# Patient Record
Sex: FEMALE | Race: WHITE | NOT HISPANIC OR LATINO | Employment: UNEMPLOYED | RURAL
[De-identification: names, ages, dates, MRNs, and addresses within clinical notes are randomized per-mention and may not be internally consistent; named-entity substitution may affect disease eponyms.]

---

## 2020-11-02 ENCOUNTER — HISTORICAL (OUTPATIENT)
Dept: ADMINISTRATIVE | Facility: HOSPITAL | Age: 50
End: 2020-11-02

## 2020-11-02 LAB
BACTERIA #/AREA URNS HPF: ABNORMAL /HPF
CLARITY UR: CLEAR
COLOR UR: YELLOW
RBC #/AREA URNS HPF: ABNORMAL /HPF (ref 0–3)
SQUAMOUS #/AREA URNS LPF: ABNORMAL /LPF
WBC #/AREA URNS HPF: ABNORMAL /HPF (ref 0–5)

## 2020-11-04 LAB
REPORT: NORMAL

## 2020-12-27 ENCOUNTER — HISTORICAL (OUTPATIENT)
Dept: ADMINISTRATIVE | Facility: HOSPITAL | Age: 50
End: 2020-12-27

## 2022-01-18 ENCOUNTER — OFFICE VISIT (OUTPATIENT)
Dept: FAMILY MEDICINE | Facility: CLINIC | Age: 52
End: 2022-01-18
Payer: COMMERCIAL

## 2022-01-18 VITALS
WEIGHT: 180 LBS | OXYGEN SATURATION: 99 % | HEART RATE: 98 BPM | RESPIRATION RATE: 18 BRPM | DIASTOLIC BLOOD PRESSURE: 98 MMHG | HEIGHT: 60 IN | SYSTOLIC BLOOD PRESSURE: 140 MMHG | BODY MASS INDEX: 35.34 KG/M2 | TEMPERATURE: 99 F

## 2022-01-18 DIAGNOSIS — R09.81 SINUS CONGESTION: ICD-10-CM

## 2022-01-18 DIAGNOSIS — U07.1 COVID-19: Primary | ICD-10-CM

## 2022-01-18 LAB
CTP QC/QA: YES
CTP QC/QA: YES
FLUAV AG NPH QL: NEGATIVE
FLUBV AG NPH QL: NEGATIVE
SARS-COV-2 AG RESP QL IA.RAPID: POSITIVE

## 2022-01-18 PROCEDURE — 3008F BODY MASS INDEX DOCD: CPT | Mod: CPTII,,, | Performed by: NURSE PRACTITIONER

## 2022-01-18 PROCEDURE — 87804 POCT INFLUENZA A/B: ICD-10-PCS | Mod: QW,,, | Performed by: NURSE PRACTITIONER

## 2022-01-18 PROCEDURE — 87426 SARSCOV CORONAVIRUS AG IA: CPT | Mod: QW,,, | Performed by: NURSE PRACTITIONER

## 2022-01-18 PROCEDURE — 3077F PR MOST RECENT SYSTOLIC BLOOD PRESSURE >= 140 MM HG: ICD-10-PCS | Mod: CPTII,,, | Performed by: NURSE PRACTITIONER

## 2022-01-18 PROCEDURE — 3080F DIAST BP >= 90 MM HG: CPT | Mod: CPTII,,, | Performed by: NURSE PRACTITIONER

## 2022-01-18 PROCEDURE — 3077F SYST BP >= 140 MM HG: CPT | Mod: CPTII,,, | Performed by: NURSE PRACTITIONER

## 2022-01-18 PROCEDURE — 1160F PR REVIEW ALL MEDS BY PRESCRIBER/CLIN PHARMACIST DOCUMENTED: ICD-10-PCS | Mod: CPTII,,, | Performed by: NURSE PRACTITIONER

## 2022-01-18 PROCEDURE — 1159F MED LIST DOCD IN RCRD: CPT | Mod: CPTII,,, | Performed by: NURSE PRACTITIONER

## 2022-01-18 PROCEDURE — 87426 SARS CORONAVIRUS 2 ANTIGEN POCT: ICD-10-PCS | Mod: QW,,, | Performed by: NURSE PRACTITIONER

## 2022-01-18 PROCEDURE — 99202 OFFICE O/P NEW SF 15 MIN: CPT | Mod: ,,, | Performed by: NURSE PRACTITIONER

## 2022-01-18 PROCEDURE — 3008F PR BODY MASS INDEX (BMI) DOCUMENTED: ICD-10-PCS | Mod: CPTII,,, | Performed by: NURSE PRACTITIONER

## 2022-01-18 PROCEDURE — 3080F PR MOST RECENT DIASTOLIC BLOOD PRESSURE >= 90 MM HG: ICD-10-PCS | Mod: CPTII,,, | Performed by: NURSE PRACTITIONER

## 2022-01-18 PROCEDURE — 1160F RVW MEDS BY RX/DR IN RCRD: CPT | Mod: CPTII,,, | Performed by: NURSE PRACTITIONER

## 2022-01-18 PROCEDURE — 87804 INFLUENZA ASSAY W/OPTIC: CPT | Mod: QW,,, | Performed by: NURSE PRACTITIONER

## 2022-01-18 PROCEDURE — 99202 PR OFFICE/OUTPT VISIT, NEW, LEVL II, 15-29 MIN: ICD-10-PCS | Mod: ,,, | Performed by: NURSE PRACTITIONER

## 2022-01-18 PROCEDURE — 1159F PR MEDICATION LIST DOCUMENTED IN MEDICAL RECORD: ICD-10-PCS | Mod: CPTII,,, | Performed by: NURSE PRACTITIONER

## 2022-01-18 RX ORDER — CETIRIZINE HYDROCHLORIDE 10 MG/1
10 TABLET ORAL DAILY
Qty: 30 TABLET | Refills: 11 | Status: SHIPPED | OUTPATIENT
Start: 2022-01-18 | End: 2022-07-28

## 2022-01-18 RX ORDER — METHYLPREDNISOLONE 4 MG/1
TABLET ORAL
Qty: 1 EACH | Refills: 0 | Status: SHIPPED | OUTPATIENT
Start: 2022-01-18 | End: 2022-02-08

## 2022-01-18 RX ORDER — AZITHROMYCIN 250 MG/1
TABLET, FILM COATED ORAL
Qty: 6 TABLET | Refills: 0 | Status: SHIPPED | OUTPATIENT
Start: 2022-01-18 | End: 2022-01-23

## 2022-01-18 RX ORDER — FLUTICASONE PROPIONATE 50 MCG
1 SPRAY, SUSPENSION (ML) NASAL DAILY
Qty: 16 G | Refills: 0 | Status: SHIPPED | OUTPATIENT
Start: 2022-01-18 | End: 2022-07-28

## 2022-01-18 NOTE — LETTER
[address] [facility] [address]  Phone: [phone]  Fax: [phone]          Return to Work/School    Patient: Mattie Mccain  YOB: 1970   Date: 01/18/2022     To Whom It May Concern:     Mattie Mccain was in contact with/seen in my office on 01/18/2022. COVID-19 is present in our communities across the [facility]. She tested COVID positive on 01/18/2022. She may return to work/school on 01/24/2022.     If you have any questions or concerns, or if I can be of further assistance, please do not hesitate to contact me.     Sincerely,    BRANDI TariqP

## 2022-01-18 NOTE — PROGRESS NOTES
New clinic note    Mattie Mccain is a 51 y.o. female      Chief Complaint   Patient presents with    Cough    Sinus Problem        Subjective:  Pt presents with sinus congestion and chills. No fever. She does have a cough. No loss of taste or smell. No body aches or headache.        History reviewed. No pertinent past medical history.   History reviewed. No pertinent family history.   History reviewed. No pertinent surgical history.   Social History     Socioeconomic History    Marital status:         Review of Systems   Constitutional: Positive for chills and fatigue. Negative for fever.   HENT: Positive for nasal congestion and sinus pressure/congestion.    Respiratory: Positive for cough. Negative for shortness of breath.    Cardiovascular: Negative for chest pain.   Neurological: Negative for headaches.        Objective:  BP (!) 140/98 (BP Location: Right arm, Patient Position: Sitting, BP Method: Medium (Automatic))   Pulse 98   Temp 98.7 °F (37.1 °C) (Oral)   Resp 18   Ht 5' (1.524 m)   Wt 81.6 kg (180 lb)   SpO2 99%   BMI 35.15 kg/m²    Physical Exam  Constitutional:       General: She is not in acute distress.     Appearance: Normal appearance.   HENT:      Head: Normocephalic.      Nose: Congestion present. No rhinorrhea.   Eyes:      Pupils: Pupils are equal, round, and reactive to light.   Cardiovascular:      Rate and Rhythm: Normal rate and regular rhythm.      Heart sounds: No murmur heard.      Pulmonary:      Effort: Pulmonary effort is normal.      Breath sounds: Normal breath sounds. No wheezing or rhonchi.   Neurological:      General: No focal deficit present.      Mental Status: She is alert and oriented to person, place, and time.          Assessment/plan:  1. COVID-19    2. Sinus congestion         Problem List Items Addressed This Visit    None     Visit Diagnoses     COVID-19    -  Primary    Relevant Medications    azithromycin (Z-HUGO) 250 MG tablet     methylPREDNISolone (MEDROL DOSEPACK) 4 mg tablet    cetirizine (ZYRTEC) 10 MG tablet    fluticasone propionate (FLONASE) 50 mcg/actuation nasal spray    Sinus congestion        Relevant Orders    SARS Coronavirus 2 Antigen, POCT (Completed)    POCT Influenza A/B (Completed)           Follow up if symptoms worsen or fail to improve.

## 2022-03-21 ENCOUNTER — OFFICE VISIT (OUTPATIENT)
Dept: FAMILY MEDICINE | Facility: CLINIC | Age: 52
End: 2022-03-21
Payer: COMMERCIAL

## 2022-03-21 VITALS
BODY MASS INDEX: 35.14 KG/M2 | HEART RATE: 92 BPM | HEIGHT: 60 IN | WEIGHT: 179 LBS | DIASTOLIC BLOOD PRESSURE: 90 MMHG | SYSTOLIC BLOOD PRESSURE: 128 MMHG | RESPIRATION RATE: 16 BRPM

## 2022-03-21 DIAGNOSIS — H66.92 LEFT OTITIS MEDIA, UNSPECIFIED OTITIS MEDIA TYPE: Primary | ICD-10-CM

## 2022-03-21 PROCEDURE — 3080F PR MOST RECENT DIASTOLIC BLOOD PRESSURE >= 90 MM HG: ICD-10-PCS | Mod: CPTII,,, | Performed by: NURSE PRACTITIONER

## 2022-03-21 PROCEDURE — 3008F BODY MASS INDEX DOCD: CPT | Mod: CPTII,,, | Performed by: NURSE PRACTITIONER

## 2022-03-21 PROCEDURE — 96372 THER/PROPH/DIAG INJ SC/IM: CPT | Mod: ,,, | Performed by: NURSE PRACTITIONER

## 2022-03-21 PROCEDURE — 3074F PR MOST RECENT SYSTOLIC BLOOD PRESSURE < 130 MM HG: ICD-10-PCS | Mod: CPTII,,, | Performed by: NURSE PRACTITIONER

## 2022-03-21 PROCEDURE — 96372 PR INJECTION,THERAP/PROPH/DIAG2ST, IM OR SUBCUT: ICD-10-PCS | Mod: ,,, | Performed by: NURSE PRACTITIONER

## 2022-03-21 PROCEDURE — 1159F MED LIST DOCD IN RCRD: CPT | Mod: CPTII,,, | Performed by: NURSE PRACTITIONER

## 2022-03-21 PROCEDURE — 3080F DIAST BP >= 90 MM HG: CPT | Mod: CPTII,,, | Performed by: NURSE PRACTITIONER

## 2022-03-21 PROCEDURE — 1160F PR REVIEW ALL MEDS BY PRESCRIBER/CLIN PHARMACIST DOCUMENTED: ICD-10-PCS | Mod: CPTII,,, | Performed by: NURSE PRACTITIONER

## 2022-03-21 PROCEDURE — 99212 OFFICE O/P EST SF 10 MIN: CPT | Mod: 25,,, | Performed by: NURSE PRACTITIONER

## 2022-03-21 PROCEDURE — 1160F RVW MEDS BY RX/DR IN RCRD: CPT | Mod: CPTII,,, | Performed by: NURSE PRACTITIONER

## 2022-03-21 PROCEDURE — 99212 PR OFFICE/OUTPT VISIT, EST, LEVL II, 10-19 MIN: ICD-10-PCS | Mod: 25,,, | Performed by: NURSE PRACTITIONER

## 2022-03-21 PROCEDURE — 3074F SYST BP LT 130 MM HG: CPT | Mod: CPTII,,, | Performed by: NURSE PRACTITIONER

## 2022-03-21 PROCEDURE — 1159F PR MEDICATION LIST DOCUMENTED IN MEDICAL RECORD: ICD-10-PCS | Mod: CPTII,,, | Performed by: NURSE PRACTITIONER

## 2022-03-21 PROCEDURE — 3008F PR BODY MASS INDEX (BMI) DOCUMENTED: ICD-10-PCS | Mod: CPTII,,, | Performed by: NURSE PRACTITIONER

## 2022-03-21 RX ORDER — CEFTRIAXONE 1 G/1
1 INJECTION, POWDER, FOR SOLUTION INTRAMUSCULAR; INTRAVENOUS
Status: COMPLETED | OUTPATIENT
Start: 2022-03-21 | End: 2022-03-21

## 2022-03-21 RX ORDER — OFLOXACIN 3 MG/ML
5 SOLUTION AURICULAR (OTIC) DAILY
Qty: 5 ML | Refills: 0 | Status: SHIPPED | OUTPATIENT
Start: 2022-03-21 | End: 2022-07-28

## 2022-03-21 RX ADMIN — CEFTRIAXONE 1 G: 1 INJECTION, POWDER, FOR SOLUTION INTRAMUSCULAR; INTRAVENOUS at 04:03

## 2022-03-21 NOTE — PROGRESS NOTES
New clinic note    Mattie Mccain is a 51 y.o. female      Chief Complaint   Patient presents with    Otalgia     Left ear pain x 1 1/2 weeks        Subjective:  Pt presents for evaluation of left ear pain. She reports s/s began several days ago and have progressed. She denies any fever or chills. Denies any bodyaches. She reports  Mild sinus congestion and drainage.     Left ear red likely related to sinus drainage.        No past medical history on file.   No family history on file.   No past surgical history on file.   Social History     Socioeconomic History    Marital status:         Review of Systems   Constitutional: Negative for fatigue and fever.   HENT: Positive for nasal congestion, ear pain and postnasal drip.         Objective:  BP (!) 128/90 (BP Location: Left arm, Patient Position: Sitting, BP Method: Medium (Automatic))   Pulse 92   Resp 16   Ht 5' (1.524 m)   Wt 81.2 kg (179 lb)   BMI 34.96 kg/m²    Physical Exam  Constitutional:       Appearance: Normal appearance.   HENT:      Left Ear: Hearing normal. Tenderness present. Tympanic membrane is erythematous.      Ears:      Comments: Left ear redness without drainage or bleeding  Neurological:      Mental Status: She is alert.          Assessment/plan:  1. Left otitis media, unspecified otitis media type         Problem List Items Addressed This Visit    None     Visit Diagnoses     Left otitis media, unspecified otitis media type    -  Primary    rx sent  cont zyrtec and flonase    Relevant Medications    cefTRIAXone injection 1 g (Start on 3/21/2022  4:15 PM)    ofloxacin (FLOXIN) 0.3 % otic solution           Follow up if symptoms worsen or fail to improve.

## 2022-07-28 ENCOUNTER — OFFICE VISIT (OUTPATIENT)
Dept: FAMILY MEDICINE | Facility: CLINIC | Age: 52
End: 2022-07-28
Payer: COMMERCIAL

## 2022-07-28 VITALS
RESPIRATION RATE: 18 BRPM | HEIGHT: 60 IN | BODY MASS INDEX: 34.75 KG/M2 | HEART RATE: 81 BPM | DIASTOLIC BLOOD PRESSURE: 84 MMHG | SYSTOLIC BLOOD PRESSURE: 132 MMHG | TEMPERATURE: 98 F | OXYGEN SATURATION: 99 % | WEIGHT: 177 LBS

## 2022-07-28 DIAGNOSIS — Z00.00 GENERAL MEDICAL EXAMINATION: Primary | ICD-10-CM

## 2022-07-28 LAB
ALBUMIN SERPL BCP-MCNC: 4 G/DL (ref 3.5–5)
ALBUMIN/GLOB SERPL: 1 {RATIO}
ALP SERPL-CCNC: 90 U/L (ref 41–108)
ALT SERPL W P-5'-P-CCNC: 23 U/L (ref 13–56)
ANION GAP SERPL CALCULATED.3IONS-SCNC: 12 MMOL/L (ref 7–16)
AST SERPL W P-5'-P-CCNC: 17 U/L (ref 15–37)
BILIRUB SERPL-MCNC: 0.8 MG/DL (ref 0–1.2)
BUN SERPL-MCNC: 14 MG/DL (ref 7–18)
BUN/CREAT SERPL: 19 (ref 6–20)
CALCIUM SERPL-MCNC: 9.6 MG/DL (ref 8.5–10.1)
CHLORIDE SERPL-SCNC: 105 MMOL/L (ref 98–107)
CHOLEST SERPL-MCNC: 220 MG/DL (ref 0–200)
CHOLEST/HDLC SERPL: 3.6 {RATIO}
CO2 SERPL-SCNC: 27 MMOL/L (ref 21–32)
CREAT SERPL-MCNC: 0.75 MG/DL (ref 0.55–1.02)
GLOBULIN SER-MCNC: 3.9 G/DL (ref 2–4)
GLUCOSE SERPL-MCNC: 90 MG/DL (ref 74–106)
HDLC SERPL-MCNC: 61 MG/DL (ref 40–60)
LDLC SERPL CALC-MCNC: 130 MG/DL
LDLC/HDLC SERPL: 2.1 {RATIO}
NONHDLC SERPL-MCNC: 159 MG/DL
POTASSIUM SERPL-SCNC: 4.5 MMOL/L (ref 3.5–5.1)
PROT SERPL-MCNC: 7.9 G/DL (ref 6.4–8.2)
SODIUM SERPL-SCNC: 139 MMOL/L (ref 136–145)
TRIGL SERPL-MCNC: 146 MG/DL (ref 35–150)
VLDLC SERPL-MCNC: 29 MG/DL

## 2022-07-28 PROCEDURE — 3079F PR MOST RECENT DIASTOLIC BLOOD PRESSURE 80-89 MM HG: ICD-10-PCS | Mod: CPTII,,, | Performed by: NURSE PRACTITIONER

## 2022-07-28 PROCEDURE — 1159F PR MEDICATION LIST DOCUMENTED IN MEDICAL RECORD: ICD-10-PCS | Mod: CPTII,,, | Performed by: NURSE PRACTITIONER

## 2022-07-28 PROCEDURE — 3008F BODY MASS INDEX DOCD: CPT | Mod: CPTII,,, | Performed by: NURSE PRACTITIONER

## 2022-07-28 PROCEDURE — 80061 LIPID PANEL: CPT | Mod: ,,, | Performed by: CLINICAL MEDICAL LABORATORY

## 2022-07-28 PROCEDURE — 3008F PR BODY MASS INDEX (BMI) DOCUMENTED: ICD-10-PCS | Mod: CPTII,,, | Performed by: NURSE PRACTITIONER

## 2022-07-28 PROCEDURE — 99396 PR PREVENTIVE VISIT,EST,40-64: ICD-10-PCS | Mod: ,,, | Performed by: NURSE PRACTITIONER

## 2022-07-28 PROCEDURE — 80053 COMPREHEN METABOLIC PANEL: CPT | Mod: ,,, | Performed by: CLINICAL MEDICAL LABORATORY

## 2022-07-28 PROCEDURE — 80061 LIPID PANEL: ICD-10-PCS | Mod: ,,, | Performed by: CLINICAL MEDICAL LABORATORY

## 2022-07-28 PROCEDURE — 1160F PR REVIEW ALL MEDS BY PRESCRIBER/CLIN PHARMACIST DOCUMENTED: ICD-10-PCS | Mod: CPTII,,, | Performed by: NURSE PRACTITIONER

## 2022-07-28 PROCEDURE — 1160F RVW MEDS BY RX/DR IN RCRD: CPT | Mod: CPTII,,, | Performed by: NURSE PRACTITIONER

## 2022-07-28 PROCEDURE — 3075F SYST BP GE 130 - 139MM HG: CPT | Mod: CPTII,,, | Performed by: NURSE PRACTITIONER

## 2022-07-28 PROCEDURE — 80053 COMPREHENSIVE METABOLIC PANEL: ICD-10-PCS | Mod: ,,, | Performed by: CLINICAL MEDICAL LABORATORY

## 2022-07-28 PROCEDURE — 3079F DIAST BP 80-89 MM HG: CPT | Mod: CPTII,,, | Performed by: NURSE PRACTITIONER

## 2022-07-28 PROCEDURE — 3075F PR MOST RECENT SYSTOLIC BLOOD PRESS GE 130-139MM HG: ICD-10-PCS | Mod: CPTII,,, | Performed by: NURSE PRACTITIONER

## 2022-07-28 PROCEDURE — 1159F MED LIST DOCD IN RCRD: CPT | Mod: CPTII,,, | Performed by: NURSE PRACTITIONER

## 2022-07-28 PROCEDURE — 99396 PREV VISIT EST AGE 40-64: CPT | Mod: ,,, | Performed by: NURSE PRACTITIONER

## 2022-07-28 NOTE — PROGRESS NOTES
OSKAR Tariq   [facility] [address] [place] [address]  [phone]      PATIENT NAME: Mattie Mccain  : 1970  DATE: 22  MRN: [id number]      Billing Provider: OSKAR Tariq  Level of Service:   Patient PCP Information     Provider PCP Type    OSKAR Tariq General          Reason for Visit / Chief Complaint: wellness physical (Pt has wellness physical annual per employer)       Update PCP  Update Chief Complaint         History of Present Illness / Problem Focused Workflow     Mattie Mccain presents to the clinic with wellness physical (Pt has wellness physical annual per employer)     Pt presents for routine yearly evaluation with lab work. She reports overall she is doing well. She reports no routine medications.       Review of Systems     Review of Systems   Constitutional: Negative for activity change and unexpected weight change.   HENT: Negative for hearing loss, rhinorrhea and trouble swallowing.    Eyes: Negative for discharge and visual disturbance.   Respiratory: Negative for chest tightness and wheezing.    Cardiovascular: Negative for chest pain and palpitations.   Gastrointestinal: Negative for blood in stool, constipation, diarrhea and vomiting.   Endocrine: Negative for polydipsia and polyuria.   Genitourinary: Negative for difficulty urinating, dysuria, hematuria and menstrual problem.   Musculoskeletal: Negative for arthralgias, joint swelling and neck pain.   Neurological: Negative for weakness and headaches.   Psychiatric/Behavioral: Negative for confusion and dysphoric mood.        Medical / Social / Family History   History reviewed. No pertinent past medical history.    History reviewed. No pertinent surgical history.    Social History  Ms. Mccain      Family History  Ms. Mccain's family history is not on file.    Medications and Allergies      Medications  No outpatient medications have been marked as taking for the 7/28/22 encounter (Office Visit) with OSKAR Tariq.       Allergies  Review of patient's allergies indicates:  No Known Allergies    Physical Examination     Vitals:    07/28/22 1044   BP: 132/84   Pulse:    Resp:    Temp:      Physical Exam  Eyes:      Pupils: Pupils are equal, round, and reactive to light.   Cardiovascular:      Rate and Rhythm: Normal rate and regular rhythm.      Heart sounds: No murmur heard.  Pulmonary:      Breath sounds: Normal breath sounds. No wheezing, rhonchi or rales.   Abdominal:      General: Bowel sounds are normal.   Musculoskeletal:         General: No swelling.      Cervical back: Normal range of motion and neck supple.   Skin:     General: Skin is warm and dry.   Neurological:      Mental Status: She is alert and oriented to person, place, and time.          Assessment and Plan (including Health Maintenance)      Problem List  Smart 4 the stars  Document Outside HM   :    Plan:         Health Maintenance Due   Topic Date Due    Hepatitis C Screening  Never done    Cervical Cancer Screening  Never done    Lipid Panel  Never done    HIV Screening  Never done    TETANUS VACCINE  Never done    Mammogram  Never done    Colorectal Cancer Screening  Never done    Shingles Vaccine (1 of 2) Never done    COVID-19 Vaccine (3 - Booster for Moderna series) 09/29/2021       Problem List Items Addressed This Visit    None     Visit Diagnoses     General medical examination    -  Primary    routine lab     Relevant Orders    Comprehensive Metabolic Panel    Lipid Panel          Health Maintenance Topics with due status: Not Due       Topic Last Completion Date    Influenza Vaccine Not Due       Future Appointments   Date Time Provider Department Center   7/27/2023  9:00 AM OSKAR Tariq [facility] [place]            Signature:  Geraldine S Berger, ACNP  [facility]  [address] [place] [address]  [phone]    Date of encounter: 7/28/22